# Patient Record
Sex: FEMALE | Race: WHITE
[De-identification: names, ages, dates, MRNs, and addresses within clinical notes are randomized per-mention and may not be internally consistent; named-entity substitution may affect disease eponyms.]

---

## 2017-07-17 ENCOUNTER — HOSPITAL ENCOUNTER (EMERGENCY)
Dept: HOSPITAL 12 - ER | Age: 82
Discharge: HOME | End: 2017-07-17
Payer: MEDICARE

## 2017-07-17 VITALS — SYSTOLIC BLOOD PRESSURE: 124 MMHG | DIASTOLIC BLOOD PRESSURE: 54 MMHG

## 2017-07-17 VITALS — WEIGHT: 125 LBS | BODY MASS INDEX: 21.34 KG/M2 | HEIGHT: 64 IN

## 2017-07-17 DIAGNOSIS — Z95.0: ICD-10-CM
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
## 2017-07-17 DIAGNOSIS — S51.021A: Primary | ICD-10-CM

## 2017-07-17 PROCEDURE — A4663 DIALYSIS BLOOD PRESSURE CUFF: HCPCS

## 2017-07-17 PROCEDURE — A4217 STERILE WATER/SALINE, 500 ML: HCPCS

## 2017-07-17 NOTE — NUR
Laceration repair completed to bilat elbows. Drsg applied, dry and intact. Pos 
CMS s/p application. Pt stable for discharge per MD. Pt and family given ACI. 
Both verbalized understanding of dc instructions. Pt wheeled out of er via w/c 
with ride home.
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
## 2018-05-25 ENCOUNTER — HOSPITAL ENCOUNTER (EMERGENCY)
Dept: HOSPITAL 54 - ER | Age: 83
Discharge: ANOTHER HOSPITAL | End: 2018-05-25
Payer: COMMERCIAL

## 2018-05-25 VITALS — HEIGHT: 66 IN | WEIGHT: 141.56 LBS | BODY MASS INDEX: 22.75 KG/M2

## 2018-05-25 VITALS — SYSTOLIC BLOOD PRESSURE: 149 MMHG | DIASTOLIC BLOOD PRESSURE: 69 MMHG

## 2018-05-25 DIAGNOSIS — S20.212A: ICD-10-CM

## 2018-05-25 DIAGNOSIS — S80.11XA: ICD-10-CM
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
## 2018-05-25 DIAGNOSIS — Z87.890: ICD-10-CM

## 2018-05-25 DIAGNOSIS — S52.602A: Primary | ICD-10-CM

## 2018-05-25 DIAGNOSIS — I10: ICD-10-CM

## 2018-05-25 DIAGNOSIS — S60.511A: ICD-10-CM

## 2018-05-25 DIAGNOSIS — S51.802A: ICD-10-CM

## 2018-05-25 DIAGNOSIS — M50.30: ICD-10-CM

## 2018-05-25 DIAGNOSIS — S61.401A: ICD-10-CM

## 2018-05-25 DIAGNOSIS — S12.9XXA: ICD-10-CM

## 2018-05-25 DIAGNOSIS — S30.1XXA: ICD-10-CM
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
## 2018-05-25 DIAGNOSIS — I71.4: ICD-10-CM

## 2018-05-25 DIAGNOSIS — S61.402A: ICD-10-CM

## 2018-05-25 LAB
ALBUMIN SERPL BCP-MCNC: 3.5 G/DL (ref 3.4–5)
ALP SERPL-CCNC: 89 U/L (ref 46–116)
ALT SERPL W P-5'-P-CCNC: 29 U/L (ref 12–78)
APTT PPP: 29 SEC (ref 23–34)
AST SERPL W P-5'-P-CCNC: 28 U/L (ref 15–37)
BASOPHILS # BLD AUTO: 0.1 /CMM (ref 0–0.2)
BASOPHILS NFR BLD AUTO: 0.9 % (ref 0–2)
BILIRUB SERPL-MCNC: 0.2 MG/DL (ref 0.2–1)
BUN SERPL-MCNC: 29 MG/DL (ref 7–18)
CALCIUM SERPL-MCNC: 9 MG/DL (ref 8.5–10.1)
CHLORIDE SERPL-SCNC: 100 MMOL/L (ref 98–107)
CO2 SERPL-SCNC: 25 MMOL/L (ref 21–32)
CREAT SERPL-MCNC: 1.1 MG/DL (ref 0.6–1.3)
EOSINOPHIL NFR BLD AUTO: 3.6 % (ref 0–6)
GLUCOSE SERPL-MCNC: 106 MG/DL (ref 74–106)
HCT VFR BLD AUTO: 32 % (ref 33–45)
HGB BLD-MCNC: 10.7 G/DL (ref 11.5–14.8)
INR PPP: 0.98 (ref 0.87–1.13)
LYMPHOCYTES NFR BLD AUTO: 0.9 /CMM (ref 0.8–4.8)
LYMPHOCYTES NFR BLD AUTO: 16.1 % (ref 20–44)
MCHC RBC AUTO-ENTMCNC: 34 G/DL (ref 31–36)
MCV RBC AUTO: 79 FL (ref 82–100)
MONOCYTES NFR BLD AUTO: 0.3 /CMM (ref 0.1–1.3)
MONOCYTES NFR BLD AUTO: 6.1 % (ref 2–12)
NEUTROPHILS # BLD AUTO: 4.1 /CMM (ref 1.8–8.9)
NEUTROPHILS NFR BLD AUTO: 73.3 % (ref 43–81)
PLATELET # BLD AUTO: 204 /CMM (ref 150–450)
POTASSIUM SERPL-SCNC: 4.8 MMOL/L (ref 3.5–5.1)
PROT SERPL-MCNC: 7.3 G/DL (ref 6.4–8.2)
RBC # BLD AUTO: 4.03 MIL/UL (ref 4–5.2)
RDW COEFFICIENT OF VARIATION: 15.8 (ref 11.5–15)
SODIUM SERPL-SCNC: 133 MMOL/L (ref 136–145)
WBC NRBC COR # BLD AUTO: 5.6 K/UL (ref 4.3–11)

## 2018-05-25 PROCEDURE — A6402 STERILE GAUZE <= 16 SQ IN: HCPCS

## 2018-05-25 PROCEDURE — L0172 CERV COL SR FOAM 2PC PRE OTS: HCPCS

## 2018-05-25 PROCEDURE — A6403 STERILE GAUZE>16 <= 48 SQ IN: HCPCS

## 2018-05-25 PROCEDURE — Z7610: HCPCS

## 2018-05-25 PROCEDURE — A4606 OXYGEN PROBE USED W OXIMETER: HCPCS
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
## 2018-05-25 NOTE — NUR
18G IV LAC INFILTRATED. IV removed. Catheter intact and site benign. Pressure 
and 4x4 applied to site. No bleeding noted.

## 2018-05-25 NOTE — NUR
[facility] [place] [facility] PT'S DAUGHTER IS GOING WITH THE 
PT. ACCEPTING MD AT [place] IS DR. AMOS

## 2018-05-25 NOTE — NUR
PRESENTED CASE TO [facility] FOR HIGHER LEVEL OF CARE TRANSFER. ED 
CHARGE NURSE STATES SHE WILL PRESENT CASE, BUT NO HAND SURGEON IS ON DUTY

## 2021-11-02 ENCOUNTER — HOSPITAL ENCOUNTER (INPATIENT)
Dept: HOSPITAL 12 - ER | Age: 86
LOS: 1 days | Discharge: LEFT BEFORE DISCHARGE | DRG: 871 | End: 2021-11-03
Attending: INTERNAL MEDICINE | Admitting: INTERNAL MEDICINE
Payer: MEDICARE

## 2021-11-02 VITALS — DIASTOLIC BLOOD PRESSURE: 49 MMHG | SYSTOLIC BLOOD PRESSURE: 72 MMHG

## 2021-11-02 VITALS — HEIGHT: 67 IN | BODY MASS INDEX: 16.95 KG/M2 | WEIGHT: 108 LBS

## 2021-11-02 VITALS — DIASTOLIC BLOOD PRESSURE: 56 MMHG | SYSTOLIC BLOOD PRESSURE: 122 MMHG

## 2021-11-02 VITALS — SYSTOLIC BLOOD PRESSURE: 120 MMHG | DIASTOLIC BLOOD PRESSURE: 50 MMHG

## 2021-11-02 DIAGNOSIS — I25.5: ICD-10-CM

## 2021-11-02 DIAGNOSIS — I25.10: ICD-10-CM

## 2021-11-02 DIAGNOSIS — K92.2: ICD-10-CM

## 2021-11-02 DIAGNOSIS — I50.33: ICD-10-CM

## 2021-11-02 DIAGNOSIS — Z79.82: ICD-10-CM

## 2021-11-02 DIAGNOSIS — A41.9: Primary | ICD-10-CM

## 2021-11-02 DIAGNOSIS — R29.6: ICD-10-CM

## 2021-11-02 DIAGNOSIS — D68.59: ICD-10-CM

## 2021-11-02 DIAGNOSIS — J69.0: ICD-10-CM

## 2021-11-02 DIAGNOSIS — F40.240: ICD-10-CM

## 2021-11-02 DIAGNOSIS — E87.6: ICD-10-CM

## 2021-11-02 DIAGNOSIS — Z95.2: ICD-10-CM

## 2021-11-02 DIAGNOSIS — R23.4: ICD-10-CM

## 2021-11-02 DIAGNOSIS — Z95.0: ICD-10-CM

## 2021-11-02 DIAGNOSIS — Z85.51: ICD-10-CM

## 2021-11-02 DIAGNOSIS — R63.6: ICD-10-CM

## 2021-11-02 DIAGNOSIS — Z95.1: ICD-10-CM

## 2021-11-02 DIAGNOSIS — D69.6: ICD-10-CM
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
## 2021-11-02 DIAGNOSIS — S40.022A: ICD-10-CM

## 2021-11-02 DIAGNOSIS — H91.90: ICD-10-CM

## 2021-11-02 DIAGNOSIS — Z85.028: ICD-10-CM

## 2021-11-02 DIAGNOSIS — J90: ICD-10-CM

## 2021-11-02 DIAGNOSIS — Z90.3: ICD-10-CM
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
## 2021-11-02 DIAGNOSIS — Z98.82: ICD-10-CM

## 2021-11-02 DIAGNOSIS — Z86.73: ICD-10-CM

## 2021-11-02 DIAGNOSIS — R62.7: ICD-10-CM

## 2021-11-02 DIAGNOSIS — E86.0: ICD-10-CM
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
## 2021-11-02 DIAGNOSIS — R53.1: ICD-10-CM

## 2021-11-02 DIAGNOSIS — D64.9: ICD-10-CM

## 2021-11-02 DIAGNOSIS — G89.4: ICD-10-CM

## 2021-11-02 DIAGNOSIS — G92.8: ICD-10-CM

## 2021-11-02 DIAGNOSIS — Z20.822: ICD-10-CM

## 2021-11-02 DIAGNOSIS — F64.9: ICD-10-CM

## 2021-11-02 DIAGNOSIS — I11.0: ICD-10-CM

## 2021-11-02 LAB
ALP SERPL-CCNC: 76 U/L (ref 50–136)
ALT SERPL W/O P-5'-P-CCNC: 10 U/L (ref 14–59)
APAP SERPL-MCNC: < 2 UG/ML (ref 10–30)
AST SERPL-CCNC: 16 U/L (ref 15–37)
BILIRUB DIRECT SERPL-MCNC: 0.2 MG/DL (ref 0–0.2)
BILIRUB SERPL-MCNC: 0.5 MG/DL (ref 0.2–1)
BUN SERPL-MCNC: 24 MG/DL (ref 7–18)
CHLORIDE SERPL-SCNC: 98 MMOL/L (ref 98–107)
CO2 SERPL-SCNC: 32 MMOL/L (ref 21–32)
CREAT SERPL-MCNC: 0.9 MG/DL (ref 0.6–1.3)
ETHANOL SERPL-MCNC: < 3 MG/DL (ref 0–0)
GLUCOSE SERPL-MCNC: 115 MG/DL (ref 74–106)
HCT VFR BLD AUTO: 31 % (ref 31.2–41.9)
MCH RBC QN AUTO: 31.2 UUG (ref 24.7–32.8)
MCV RBC AUTO: 92.7 FL (ref 75.5–95.3)
PLATELET # BLD AUTO: 160 K/UL (ref 179–408)
POTASSIUM SERPL-SCNC: 3.1 MMOL/L (ref 3.5–5.1)
TSH SERPL DL<=0.005 MIU/L-ACNC: 2.71 MIU/ML (ref 0.36–3.74)
WS STN SPEC: 6.6 G/DL (ref 6.4–8.2)

## 2021-11-02 PROCEDURE — C9113 INJ PANTOPRAZOLE SODIUM, VIA: HCPCS

## 2021-11-02 PROCEDURE — A4663 DIALYSIS BLOOD PRESSURE CUFF: HCPCS

## 2021-11-02 PROCEDURE — G0378 HOSPITAL OBSERVATION PER HR: HCPCS

## 2021-11-02 PROCEDURE — G0480 DRUG TEST DEF 1-7 CLASSES: HCPCS

## 2021-11-02 PROCEDURE — A6209 FOAM DRSG <=16 SQ IN W/O BDR: HCPCS

## 2021-11-02 RX ADMIN — RANOLAZINE SCH MG: 500 TABLET, FILM COATED, EXTENDED RELEASE ORAL at 21:36

## 2021-11-02 NOTE — NUR
PT IS UNABLE TO PROVIDE INFORMATION ABOUT HER HOME MEDICATION. PT's SISTER IS 
GOING TO BRING MEDICATION LIST LATER.

## 2021-11-02 NOTE — NUR
admitted from home via er an 89 yo female with admitting dx of PNEUMONIA. Awake alert snd 
oriented x3, able to participate with admission assessment. routine admission assessment 
initiated, will notify DR Gtz of admission

## 2021-11-02 NOTE — NUR
Received shift report, patient on RA with no signs of respiratory distress. Patient 
requested sleeping medication. Ambien 5mg given per patient's request.

## 2021-11-03 VITALS — SYSTOLIC BLOOD PRESSURE: 125 MMHG | DIASTOLIC BLOOD PRESSURE: 60 MMHG

## 2021-11-03 VITALS — DIASTOLIC BLOOD PRESSURE: 59 MMHG | SYSTOLIC BLOOD PRESSURE: 124 MMHG

## 2021-11-03 LAB
ALP SERPL-CCNC: 84 U/L (ref 50–136)
ALT SERPL W/O P-5'-P-CCNC: 20 U/L (ref 14–59)
AST SERPL-CCNC: 23 U/L (ref 15–37)
BILIRUB SERPL-MCNC: 0.7 MG/DL (ref 0.2–1)
BUN SERPL-MCNC: 20 MG/DL (ref 7–18)
CHLORIDE SERPL-SCNC: 102 MMOL/L (ref 98–107)
CHOLEST SERPL-MCNC: 160 MG/DL (ref ?–200)
CO2 SERPL-SCNC: 29 MMOL/L (ref 21–32)
CREAT SERPL-MCNC: 1 MG/DL (ref 0.6–1.3)
GLUCOSE SERPL-MCNC: 96 MG/DL (ref 74–106)
HCT VFR BLD AUTO: 28.1 % (ref 31.2–41.9)
HDLC SERPL-MCNC: 43 MG/DL (ref 40–60)
IRON SERPL-MCNC: 72 UG/DL (ref 50–175)
MAGNESIUM SERPL-MCNC: 2.2 MG/DL (ref 1.8–2.4)
MCH RBC QN AUTO: 31.9 UUG (ref 24.7–32.8)
MCV RBC AUTO: 92.5 FL (ref 75.5–95.3)
PHOSPHATE SERPL-MCNC: 3.1 MG/DL (ref 2.5–4.9)
PLATELET # BLD AUTO: 146 K/UL (ref 179–408)
POTASSIUM SERPL-SCNC: 3 MMOL/L (ref 3.5–5.1)
TRIGL SERPL-MCNC: 91 MG/DL (ref 30–150)
WS STN SPEC: 6 G/DL (ref 6.4–8.2)

## 2021-11-03 RX ADMIN — RANOLAZINE SCH MG: 500 TABLET, FILM COATED, EXTENDED RELEASE ORAL at 09:24

## 2021-11-03 NOTE — NUR
WOUND CARE CONSULT: PT PRESENTS WITH CACHEXIA, LEFT ARM SKIN TEARS, SACRAL FULL THICKNESS 
WOUND (FOR 1 YEAR PER PT REPORT), EDEMA WITH DISCOLORATION TO LOWER EXTREMITIES, ALL PRESENT 
ON ADMISSION. RECOMMENDATIONS MADE FOR WOUND CARE AND SKIN PROTECTION. DISCUSSED WITH 
NURSING STAFF. SURGICAL CONSULT MADE TO DR MARS MEAD FOR SACRAL WOUND. FIRST STEP LOW 
AIRLOSS MATTRESS IS ON ORDER. DIETARY CONSULT IN PLACE. MD IN AGREEMENT WITH PLAN OF CARE. 

-------------------------------------------------------------------------------

Addendum: 11/03/21 at 1110 by JOSY HO RN

-------------------------------------------------------------------------------

Amended: Links added.

## 2021-11-03 NOTE — NUR
Per patient "i dont want to wait anymore, i want to leave." explained that it is not 
recommended for patient to leave because they are not medically cleared. Patient stated " it 
doesnt matter i want to leave" explained that they would have to sign an AMA form . Per 
patient its ok ill sign i will do the J-tube outpatient and i will just follow up with my 
doctor. Again explained risks of leaving AMA. Patient again stated they just want to leave. 
AMA form signed by patient, family at bed side, stating we just want to go home.

## 2021-11-03 NOTE — NUR
Patient resting in bed, AO x 4, SR and PVC on tele monitor. Patient intermittently sleeping, 
no signs of respiratory discomfort. Patient tolerated medication and care throughout the 
shift. Call lights within reach, bed alarm on, and bed locked in lowest position.  Will 
endorse to incoming shift.

## 2021-11-03 NOTE — NUR
IV site removed, telemetry monitor removed, id band removed. patient assisted onto vehicle. 
patient with cathy rashidl at time that they left.

## 2021-11-03 NOTE — NUR
received patient laying in bed in no acute distress. patient is alert and oriented and able 
to make needs known. patient is hard of hearing. v/s wnl at this time. denies any pain or 
discomfort at this time. reminded to use call light for help. side rails up x2, will 
monitor.

## 2021-11-03 NOTE — NUR
Patients family member at bed side asking to talk to Dr. Gtz r: possible gt/jtube 
placement. Dr. jeronimo made aware, per Dr. isa Cordoba is aware and will be 
in unit later on to talk to patient and family. patient aware.